# Patient Record
Sex: MALE | Race: WHITE | URBAN - NONMETROPOLITAN AREA
[De-identification: names, ages, dates, MRNs, and addresses within clinical notes are randomized per-mention and may not be internally consistent; named-entity substitution may affect disease eponyms.]

---

## 2017-02-08 ENCOUNTER — OFFICE VISIT - GICH (OUTPATIENT)
Dept: FAMILY MEDICINE | Facility: OTHER | Age: 32
End: 2017-02-08

## 2017-02-08 ENCOUNTER — HISTORY (OUTPATIENT)
Dept: FAMILY MEDICINE | Facility: OTHER | Age: 32
End: 2017-02-08

## 2017-02-08 DIAGNOSIS — Z00.00 ENCOUNTER FOR GENERAL ADULT MEDICAL EXAMINATION WITHOUT ABNORMAL FINDINGS: ICD-10-CM

## 2018-01-03 NOTE — PATIENT INSTRUCTIONS
"Patient Information     Patient Name MREllis Rodriguez [id number] Male 1985      Patient Instructions by Sherry Sánchez PA-C at 2017  9:15 AM     Author:  Sherry Sánchez PA-C Service:  (none) Author Type:  PHYS- Physician Assistant     Filed:  2017  9:52 AM Encounter Date:  2017 Status:  Signed     :  Sherry Sánchez PA-C (PHYS- Physician Assistant)            Healthy Strategies  1. Eat at least 3 meals a day and never skip breakfast.  2. Eat more slowly.  3. Decrease portion size.  4. Provide structure by using meal replacement bars or shakes, and/or low calorie frozen meals.  5. For good nutrition incorporate fruit, vegetables, whole grains, lean protein, and low-fat dairy.  6. Remove trigger foods from your environment to avoid impulse eating.  7. Increase physical activity: get a pedometer and aim for 10,000 steps a day or 30-35 minutes of activity 5 days per week.  8. Weigh yourself daily or at least weekly.  9. Keep a record of what you eat and your activity.  10. Establish a support system such as a friend, group or program.  11. Read Sae Johnson's \"Eat to Live\". Remember it is important to have a minimum of 1200 calories a day, okay to use olive oil, 40 grams of fiber daily. No more than two servings ( the size of your palm) of red meat a week.     Please consider the following general health recommendations:    Eat a quality diet (generally, low in simple sugars, starches, cholesterol and saturated fat.)    Please get 1500 mg of calcium in divided doses with 1500 units vitamin D in your diet daily.     Stay physically active. Regular walking or other exercise is one of the best ways to minimize pain of arthritis; maintain independence and mobility; maintain bone strength; maintain conditioning of your heart. Find something you enjoy and a friend to do it with you.    Maintain ideal weight. Your Body mass index is Body mass index is 24.12 kg/(m^2).. Generally a " BMI of 20-25 is considered ideal. Overweight is defined as 25-30, Obese is 30-35 and markedly obese is greater than 35.    Apply sun block (SPF 25 or greater) on exposed skin anytime you are out in the sun to prevent skin cancer.     Wear a seatbelt whenever you are in a car.    Obtain a flu shot every fall.    You should have a tetanus booster at least once every 10 years.    Check blood sugar annually. Cholesterol annually unless you have had a normal level when last checked within 5 years.     I recommend that you have a general physical exam every year.

## 2018-01-03 NOTE — PROGRESS NOTES
Patient Information     Patient Name MRN Sex     Ellis Ye [id number] Male 1985      Progress Notes by Sherry Sánchez PA-C at 2017  9:15 AM     Author:  Sherry Sánchez PA-C Service:  (none) Author Type:  PHYS- Physician Assistant     Filed:  2017  9:53 AM Encounter Date:  2017 Status:  Signed     :  Sherry Sánchez PA-C (PHYS- Physician Assistant)            Nursing Notes:   Chas Pond  2017  9:40 AM  Signed  Pt here today for a physical, pt is from [facility].  Chas Pond LPN .............2017  9:30 AM      HPI: Ellis Ye is a 31 y.o. male who presents for a yearly exam.  Concerns include: Here for a physical from [facility]. Hx alcohol abuse.     STD concerns: no  Cholesterol/DM concerns/screening: no  Prostate cancer screening discussed:  Not indicated, patient is average risk and younger than 50.  Family history of colon or prostate CA?: no  Colonoscopy: na  Immunizations: declined Tdap    Patient Active Problem List       Diagnosis  Date Noted     Alcohol abuse  2017     Currently at [facility]          Past Medical History     Diagnosis  Date     No Significant Past Medical History        Past Surgical History      Procedure  Laterality Date     No past surgeries         Social History     Social History        Marital status:  Single     Spouse name: N/A     Number of children:  N/A     Years of education:  N/A     Occupational History      Not on file.     Social History Main Topics          Smoking status:   Current Every Day Smoker      Packs/day:  0.50      Smokeless tobacco:   Never Used      Alcohol use   Yes      Comment: currently at [facility]       Drug use:   No      Sexual activity:   Not on file      Other Topics  Concern     Not on file      Social History Narrative     Preload  2013       Family History      Problem  Relation Age of Onset     Diabetes type II Maternal Grandmother   "      No current outpatient prescriptions on file.     No current facility-administered medications for this visit.      Medications have been reviewed by me and are current to the best of my knowledge and ability.       REVIEW OF SYSTEMS:  Refer to HPI.    Physical Exam:  Visit Vitals       /90     Pulse 96     Temp 98.3  F (36.8  C) (Tympanic)     Ht 1.702 m (5' 7\")     Wt 69.9 kg (154 lb)     BMI 24.12 kg/m2      CONSTITUTIONAL:  Alert, cooperative, NAD.  HEAD:  Normal. Normocephalic, atraumatic.  EYES:  Normal external eye, conjunctiva, lids.  No scleral icterus.  ENT/MOUTH:  External ears and nose normal.  Moist mucous membranes.    ENDO: No thyromegaly or thyroid nodules.  LYMPH:  No cervical or supraclavicular LA.    CARDIOVASCULAR: Regular, S1, S2.  No S3 or S4.  No murmur/gallop/rub.  No peripheral edema.  RESPIRATORY: CTA bilaterally, no wheezes, rhonchi or rales.  GI: Bowel sounds wnl.  Soft, nontender, nondistended.  No masses or HSM.  No rebound or guarding.  MSKEL: Grossly normal ROM.  No clubbing.  INTEGUMENTARY:  Warm, dry.  No rash noted on exposed skin.  NEUROLOGIC:  Facies symmetric.  Grossly normal movement and tone.  No tremor.  PSYCHIATRIC:  Affect normal.  Speech fluent.       PHQ Depression Screen  Date of PHQ exam: 02/08/17  Over the last 2 weeks, how often have you been bothered by any of the following problems?  1. Little interest or pleasure in doing things: 0 - Not at all  2. Feeling down, depressed, or hopeless: 0 - Not at all      ASSESSMENT/PLAN:    ICD-10-CM    1. Physical exam Z00.00    2. Need for immunization against influenza Z23        Completed paperwork.   See scanned in paper work.    Patient Instructions   Healthy Strategies  1. Eat at least 3 meals a day and never skip breakfast.  2. Eat more slowly.  3. Decrease portion size.  4. Provide structure by using meal replacement bars or shakes, and/or low calorie frozen meals.  5. For good nutrition incorporate fruit, " "vegetables, whole grains, lean protein, and low-fat dairy.  6. Remove trigger foods from your environment to avoid impulse eating.  7. Increase physical activity: get a pedometer and aim for 10,000 steps a day or 30-35 minutes of activity 5 days per week.  8. Weigh yourself daily or at least weekly.  9. Keep a record of what you eat and your activity.  10. Establish a support system such as a friend, group or program.  11. Read Sae Johnson's \"Eat to Live\". Remember it is important to have a minimum of 1200 calories a day, okay to use olive oil, 40 grams of fiber daily. No more than two servings ( the size of your palm) of red meat a week.     Please consider the following general health recommendations:    Eat a quality diet (generally, low in simple sugars, starches, cholesterol and saturated fat.)    Please get 1500 mg of calcium in divided doses with 1500 units vitamin D in your diet daily.     Stay physically active. Regular walking or other exercise is one of the best ways to minimize pain of arthritis; maintain independence and mobility; maintain bone strength; maintain conditioning of your heart. Find something you enjoy and a friend to do it with you.    Maintain ideal weight. Your Body mass index is Body mass index is 24.12 kg/(m^2).. Generally a BMI of 20-25 is considered ideal. Overweight is defined as 25-30, Obese is 30-35 and markedly obese is greater than 35.    Apply sun block (SPF 25 or greater) on exposed skin anytime you are out in the sun to prevent skin cancer.     Wear a seatbelt whenever you are in a car.    Obtain a flu shot every fall.    You should have a tetanus booster at least once every 10 years.    Check blood sugar annually. Cholesterol annually unless you have had a normal level when last checked within 5 years.     I recommend that you have a general physical exam every year.           Colon and prostate cancer screening discussed.      Counseled on healthy diet and " exercise.    CINTHIA Pollack...................  2/8/2017   9:36 AM

## 2018-01-03 NOTE — NURSING NOTE
Patient Information     Patient Name MRN Sex Ellis Xiong [id number] Male 1985      Nursing Note by Chas Pond at 2017  9:15 AM     Author:  Chas Pond Service:  (none) Author Type:  (none)     Filed:  2017  9:40 AM Encounter Date:  2017 Status:  Signed     :  Chas Pond            Pt here today for a physical, pt is from [facility].  Chas Pond LPN .............2017  9:30 AM

## 2018-01-28 VITALS
DIASTOLIC BLOOD PRESSURE: 90 MMHG | BODY MASS INDEX: 24.17 KG/M2 | SYSTOLIC BLOOD PRESSURE: 140 MMHG | HEART RATE: 96 BPM | WEIGHT: 154 LBS | HEIGHT: 67 IN | TEMPERATURE: 98.3 F